# Patient Record
Sex: MALE | Employment: FULL TIME | URBAN - METROPOLITAN AREA
[De-identification: names, ages, dates, MRNs, and addresses within clinical notes are randomized per-mention and may not be internally consistent; named-entity substitution may affect disease eponyms.]

---

## 2023-06-22 PROBLEM — L03.031 PARONYCHIA OF TOENAIL OF RIGHT FOOT: Status: ACTIVE | Noted: 2023-06-22

## 2023-06-22 PROBLEM — D22.9 ATYPICAL MOLE: Status: ACTIVE | Noted: 2023-06-22

## 2023-06-22 PROBLEM — E73.9 LACTOSE INTOLERANCE: Status: ACTIVE | Noted: 2023-06-22

## 2023-06-22 PROBLEM — R05.9 COUGH: Status: ACTIVE | Noted: 2023-06-22

## 2023-06-22 PROBLEM — D23.5 OTHER BENIGN NEOPLASM OF SKIN OF TRUNK: Status: ACTIVE | Noted: 2023-06-22

## 2023-06-22 PROBLEM — L98.9 SKIN LESION: Status: ACTIVE | Noted: 2023-06-22

## 2023-06-22 PROBLEM — L70.9 ACNE: Status: ACTIVE | Noted: 2023-06-22

## 2023-06-22 PROBLEM — A37.90 PERTUSSIS-LIKE SYNDROME: Status: ACTIVE | Noted: 2023-06-22

## 2023-07-06 ENCOUNTER — OFFICE VISIT (OUTPATIENT)
Dept: PRIMARY CARE | Facility: CLINIC | Age: 23
End: 2023-07-06
Payer: COMMERCIAL

## 2023-07-06 VITALS
BODY MASS INDEX: 26.05 KG/M2 | HEART RATE: 79 BPM | SYSTOLIC BLOOD PRESSURE: 110 MMHG | WEIGHT: 182 LBS | OXYGEN SATURATION: 98 % | HEIGHT: 70 IN | TEMPERATURE: 98.3 F | DIASTOLIC BLOOD PRESSURE: 70 MMHG | RESPIRATION RATE: 16 BRPM

## 2023-07-06 DIAGNOSIS — Z00.00 HEALTHCARE MAINTENANCE: Primary | ICD-10-CM

## 2023-07-06 PROCEDURE — 99395 PREV VISIT EST AGE 18-39: CPT | Performed by: INTERNAL MEDICINE

## 2023-07-06 PROCEDURE — 1036F TOBACCO NON-USER: CPT | Performed by: INTERNAL MEDICINE

## 2023-07-06 ASSESSMENT — ENCOUNTER SYMPTOMS
SLEEP DISTURBANCE: 0
ABDOMINAL PAIN: 0
DIARRHEA: 0
PALPITATIONS: 0
SHORTNESS OF BREATH: 0
BLOOD IN STOOL: 0
ARTHRALGIAS: 0
CONSTIPATION: 0
DIAPHORESIS: 0

## 2023-07-06 ASSESSMENT — PATIENT HEALTH QUESTIONNAIRE - PHQ9
1. LITTLE INTEREST OR PLEASURE IN DOING THINGS: NOT AT ALL
SUM OF ALL RESPONSES TO PHQ9 QUESTIONS 1 AND 2: 0
2. FEELING DOWN, DEPRESSED OR HOPELESS: NOT AT ALL

## 2023-07-06 NOTE — PROGRESS NOTES
Patient here for a physical - declined chaperone     Subjective   Patient ID: Escobar Chery is a 22 y.o. male who presents for Annual Exam.  He is generally doing well today and has no complaints.    The patient denies any hearing impairment or vision changes and wears prescription lenses.  He undergoes regular Eye Exams, Dental Checkups, and reports good sleep quality.  He denies any night sweats, dyspnea, palpitations, chest pain, chest pressure, abdominal pain, hematochezia, melena, joint pain, testicular pain, or hernia.    The patient denies any significant known family history.    The patient is  and has a six month old son.  He is up to date with his TDAP booster.        Review of Systems   Constitutional:  Negative for diaphoresis.   HENT:  Negative for hearing loss.    Eyes:  Negative for visual disturbance.   Respiratory:  Negative for shortness of breath.    Cardiovascular:  Negative for chest pain and palpitations.   Gastrointestinal:  Negative for abdominal pain, blood in stool, constipation and diarrhea.        Negative for hernias.   Genitourinary:  Negative for testicular pain.   Musculoskeletal:  Negative for arthralgias.   Psychiatric/Behavioral:  Negative for sleep disturbance.        Objective   Physical Exam  Constitutional:       Appearance: Normal appearance.   Cardiovascular:      Rate and Rhythm: Normal rate and regular rhythm.      Heart sounds: Normal heart sounds.   Pulmonary:      Effort: Pulmonary effort is normal.      Breath sounds: Normal breath sounds.   Abdominal:      General: Bowel sounds are normal.      Palpations: Abdomen is soft.      Tenderness: There is no abdominal tenderness.   Skin:     General: Skin is warm and dry.   Neurological:      General: No focal deficit present.      Mental Status: He is alert and oriented to person, place, and time. Mental status is at baseline.   Psychiatric:         Mood and Affect: Mood normal.         Behavior: Behavior normal.        Assessment/Plan   Problem List Items Addressed This Visit    None      CPE Performed  -  Discussed healthy diet and regular exercise.    -  Physical exam overall unremarkable. Immunizations reviewed and updated accordingly. Healthy lifestyle choices discussed (tobacco avoidance, appropriate alcohol use, avoidance of illicit substances).   -  Patient is wearing seatbelt.   -  Screening lab work ordered as indicated.    -  Age appropriate screening tests reviewed with patient.        IMPRESSION/PLAN :     /70 in the office today.    Skin lesions   - Mole above left nipple and on right foot.  Previously referred to Dermatology for further evaluation and treatment.      Cyst on back   - Previously referred to Dermatology for further evaluation and treatment.      Health Maintenance   - Routine labs 7/2022.  Patient states he recently received his TDAP booster.     Follow up for regular wellness visit, call sooner if needed.        Scribe Attestation  By signing my name below, ITanya Scribe   attest that this documentation has been prepared under the direction and in the presence of Ryan Noyola DO.

## 2024-06-25 ENCOUNTER — APPOINTMENT (OUTPATIENT)
Dept: PRIMARY CARE | Facility: CLINIC | Age: 24
End: 2024-06-25
Payer: COMMERCIAL

## 2024-06-25 VITALS
RESPIRATION RATE: 16 BRPM | SYSTOLIC BLOOD PRESSURE: 110 MMHG | OXYGEN SATURATION: 96 % | BODY MASS INDEX: 25.77 KG/M2 | HEART RATE: 90 BPM | HEIGHT: 69 IN | DIASTOLIC BLOOD PRESSURE: 70 MMHG | WEIGHT: 174 LBS | TEMPERATURE: 97.5 F

## 2024-06-25 DIAGNOSIS — Z00.00 HEALTHCARE MAINTENANCE: Primary | ICD-10-CM

## 2024-06-25 PROCEDURE — 99395 PREV VISIT EST AGE 18-39: CPT | Performed by: INTERNAL MEDICINE

## 2024-06-25 PROCEDURE — 1036F TOBACCO NON-USER: CPT | Performed by: INTERNAL MEDICINE

## 2024-06-25 ASSESSMENT — ENCOUNTER SYMPTOMS
DIARRHEA: 0
PALPITATIONS: 0
CONSTIPATION: 0
SHORTNESS OF BREATH: 0
ABDOMINAL PAIN: 0
BLOOD IN STOOL: 0
ARTHRALGIAS: 0

## 2024-06-25 ASSESSMENT — PATIENT HEALTH QUESTIONNAIRE - PHQ9
2. FEELING DOWN, DEPRESSED OR HOPELESS: NOT AT ALL
1. LITTLE INTEREST OR PLEASURE IN DOING THINGS: NOT AT ALL
SUM OF ALL RESPONSES TO PHQ9 QUESTIONS 1 AND 2: 0

## 2024-06-25 NOTE — PROGRESS NOTES
Patient here for a physical - declined chaperone     Subjective   Patient ID: Escobar Chery is a 23 y.o. male who presents for Annual Exam.    The patient has been managing lactose intolerance symptoms with over the counter tablets and finds that this is working well.  He denies any abdominal pain, further bowel problems, hematochezia, melena, or known family history of colon cancer.     The patient reports a weight loss of 8 lbs since 7/2023 which he believes is related to eating less at work and becoming a father.    The patient recalls meeting with Dermatology, and that the cyst on the back has since resolved.    The patient denies any hearing impairment or vision changes, and wears prescription lenses.  He follows regularly with a Dentist.  The patient denies any dyspnea, palpitations, testicular pain, scrotal swelling, hernia, or joint pain.     The patient is  and has a 1.5 year old son, and has another child on the way.      Review of Systems   HENT:  Negative for hearing loss.    Eyes:  Negative for visual disturbance.   Respiratory:  Negative for shortness of breath.    Cardiovascular:  Negative for palpitations.   Gastrointestinal:  Negative for abdominal pain, blood in stool, constipation and diarrhea.   Genitourinary:  Negative for penile swelling and scrotal swelling.        Negative for hernia.   Musculoskeletal:  Negative for arthralgias.     Objective   Physical Exam  Constitutional:       Appearance: Normal appearance.   Neck:      Vascular: No carotid bruit.   Cardiovascular:      Rate and Rhythm: Normal rate and regular rhythm.      Heart sounds: Normal heart sounds.   Pulmonary:      Effort: Pulmonary effort is normal.      Breath sounds: Normal breath sounds.   Abdominal:      General: Bowel sounds are normal.      Palpations: Abdomen is soft.      Tenderness: There is no abdominal tenderness.   Skin:     General: Skin is warm and dry.   Neurological:      General: No focal deficit  present.      Mental Status: He is alert and oriented to person, place, and time. Mental status is at baseline.   Psychiatric:         Mood and Affect: Mood normal.         Behavior: Behavior normal.         Assessment/Plan   Problem List Items Addressed This Visit    None  Visit Diagnoses         Codes    Healthcare maintenance    -  Primary Z00.00    Relevant Orders    Lipid panel    CBC and Auto Differential    Comprehensive metabolic panel    Tsh With Reflex To Free T4 If Abnormal            CPE Performed  -  Discussed healthy diet and regular exercise.    -  Physical exam overall unremarkable. Immunizations reviewed and updated accordingly. Healthy lifestyle choices discussed (tobacco avoidance, appropriate alcohol use, avoidance of illicit substances).   -  Patient is wearing seatbelt.   -  Screening lab work ordered as indicated.    -  Age appropriate screening tests reviewed with patient.        IMPRESSION/PLAN :     /70 in the office today.     Health Maintenance   - Routine labs ordered including CBC, CMP, and a lipid panel to be completed in the fasting state.  Added TSH.  Last Td 6/10/2021.     Follow up for regular wellness visit, call sooner if needed.        Scribe Attestation  By signing my name below, I, Tanya Curtis, Scribe   attest that this documentation has been prepared under the direction and in the presence of Ryan Noyola DO.   Tanya Curtis 06/25/24 8:10 AM

## 2025-04-15 ENCOUNTER — TELEPHONE (OUTPATIENT)
Dept: PRIMARY CARE | Facility: CLINIC | Age: 25
End: 2025-04-15
Payer: COMMERCIAL

## 2025-04-15 NOTE — TELEPHONE ENCOUNTER
Pt wants to move his cpe from June to end of this month - has forms he needs from work filled out - please advise if ok to use same day slot ?

## 2025-04-29 ENCOUNTER — APPOINTMENT (OUTPATIENT)
Dept: PRIMARY CARE | Facility: CLINIC | Age: 25
End: 2025-04-29
Payer: COMMERCIAL

## 2025-04-29 VITALS
DIASTOLIC BLOOD PRESSURE: 70 MMHG | BODY MASS INDEX: 24.88 KG/M2 | RESPIRATION RATE: 16 BRPM | TEMPERATURE: 97.8 F | HEART RATE: 77 BPM | SYSTOLIC BLOOD PRESSURE: 120 MMHG | OXYGEN SATURATION: 98 % | HEIGHT: 69 IN | WEIGHT: 168 LBS

## 2025-04-29 DIAGNOSIS — Z00.00 HEALTHCARE MAINTENANCE: Primary | ICD-10-CM

## 2025-04-29 PROCEDURE — 1036F TOBACCO NON-USER: CPT | Performed by: INTERNAL MEDICINE

## 2025-04-29 PROCEDURE — 3008F BODY MASS INDEX DOCD: CPT | Performed by: INTERNAL MEDICINE

## 2025-04-29 PROCEDURE — 99395 PREV VISIT EST AGE 18-39: CPT | Performed by: INTERNAL MEDICINE

## 2025-04-29 ASSESSMENT — ENCOUNTER SYMPTOMS
ROS GI COMMENTS: NEGATIVE FOR HERNIA.
ACTIVITY CHANGE: 0
DIARRHEA: 0
BLOOD IN STOOL: 0
ABDOMINAL PAIN: 0
ARTHRALGIAS: 0
FREQUENCY: 0
CONSTIPATION: 0
SLEEP DISTURBANCE: 0

## 2025-04-29 NOTE — PROGRESS NOTES
Patient here for a physical     Subjective   Patient ID: Escobar Chery is a 24 y.o. male who presents for Annual Exam.    The patient has been taking Zyrtec for seasonal allergies, and finds that this is working well.    The patient denies any hearing impairment or vision changes.  He follows regularly with a Dentist, and reports good sleep quality and energy levels.  The patient denies any abdominal pain, hematochezia, melena, bowel problems, significant urinary symptoms, testicular pain, scrotal swelling, hernia, or joint pain.     The patient's grandfather was a smoker and had a history of lung cancer.  His father had a history of bladder cancer.  The patient denies any known family history of colon cancer.    The patient recently welcomed his second child in 12/2024.      Review of Systems   Constitutional:  Negative for activity change.   HENT:  Negative for hearing loss.    Eyes:  Negative for visual disturbance.   Gastrointestinal:  Negative for abdominal pain, blood in stool, constipation and diarrhea.        Negative for hernia.   Genitourinary:  Negative for frequency, scrotal swelling and testicular pain.   Musculoskeletal:  Negative for arthralgias.   Psychiatric/Behavioral:  Negative for sleep disturbance.      Objective   Physical Exam  Constitutional:       Appearance: Normal appearance.   Neck:      Vascular: No carotid bruit.   Cardiovascular:      Rate and Rhythm: Normal rate and regular rhythm.      Heart sounds: Normal heart sounds.   Pulmonary:      Effort: Pulmonary effort is normal.      Breath sounds: Normal breath sounds.   Abdominal:      General: Bowel sounds are normal.      Palpations: Abdomen is soft.      Tenderness: There is no abdominal tenderness.   Skin:     General: Skin is warm and dry.   Neurological:      General: No focal deficit present.      Mental Status: He is alert and oriented to person, place, and time. Mental status is at baseline.   Psychiatric:         Mood and  Affect: Mood normal.         Behavior: Behavior normal.         Assessment/Plan   Problem List Items Addressed This Visit    None  Visit Diagnoses         Codes      Healthcare maintenance    -  Primary Z00.00    Relevant Orders    Lipid panel    CBC and Auto Differential    Comprehensive metabolic panel            CPE Performed  -  Discussed healthy diet and regular exercise.    -  Physical exam overall unremarkable. Immunizations reviewed and updated accordingly. Healthy lifestyle choices discussed (tobacco avoidance, appropriate alcohol use, avoidance of illicit substances).   -  Patient is wearing seatbelt.   -  Screening lab work ordered as indicated.    -  Age appropriate screening tests reviewed with patient.        IMPRESSION/PLAN:     /70 in the office today.     Health Maintenance   - Routine labs ordered including CBC, CMP, and a lipid panel to be completed in the fasting state.  Last Tdap 6/10/2021.     Follow up for regular wellness visit, call sooner if needed.        Scribe Attestation  By signing my name below, ITanya, Brittany   attest that this documentation has been prepared under the direction and in the presence of Ryan Noyola DO.   Tanya Curtis 04/29/25 10:35 AM

## 2025-06-26 ENCOUNTER — APPOINTMENT (OUTPATIENT)
Dept: PRIMARY CARE | Facility: CLINIC | Age: 25
End: 2025-06-26

## 2026-04-22 ENCOUNTER — APPOINTMENT (OUTPATIENT)
Dept: PRIMARY CARE | Facility: CLINIC | Age: 26
End: 2026-04-22
Payer: COMMERCIAL